# Patient Record
Sex: FEMALE | Race: OTHER | HISPANIC OR LATINO
[De-identification: names, ages, dates, MRNs, and addresses within clinical notes are randomized per-mention and may not be internally consistent; named-entity substitution may affect disease eponyms.]

---

## 2023-06-15 ENCOUNTER — APPOINTMENT (OUTPATIENT)
Dept: FAMILY MEDICINE | Facility: CLINIC | Age: 33
End: 2023-06-15
Payer: COMMERCIAL

## 2023-06-15 VITALS
SYSTOLIC BLOOD PRESSURE: 95 MMHG | TEMPERATURE: 98.4 F | BODY MASS INDEX: 25.89 KG/M2 | DIASTOLIC BLOOD PRESSURE: 63 MMHG | HEART RATE: 70 BPM | OXYGEN SATURATION: 97 % | RESPIRATION RATE: 14 BRPM | HEIGHT: 66.54 IN | WEIGHT: 163 LBS

## 2023-06-15 DIAGNOSIS — Z82.49 FAMILY HISTORY OF ISCHEMIC HEART DISEASE AND OTHER DISEASES OF THE CIRCULATORY SYSTEM: ICD-10-CM

## 2023-06-15 DIAGNOSIS — Z00.00 ENCOUNTER FOR GENERAL ADULT MEDICAL EXAMINATION W/OUT ABNORMAL FINDINGS: ICD-10-CM

## 2023-06-15 DIAGNOSIS — F12.91 CANNABIS USE, UNSPECIFIED, IN REMISSION: ICD-10-CM

## 2023-06-15 DIAGNOSIS — R09.81 NASAL CONGESTION: ICD-10-CM

## 2023-06-15 DIAGNOSIS — E04.9 NONTOXIC GOITER, UNSPECIFIED: ICD-10-CM

## 2023-06-15 DIAGNOSIS — Z80.49 FAMILY HISTORY OF MALIGNANT NEOPLASM OF OTHER GENITAL ORGANS: ICD-10-CM

## 2023-06-15 PROCEDURE — 99385 PREV VISIT NEW AGE 18-39: CPT | Mod: 25

## 2023-06-15 PROCEDURE — 36415 COLL VENOUS BLD VENIPUNCTURE: CPT

## 2023-06-15 NOTE — PAST MEDICAL HISTORY
[Menstruating] : menstruating [Definite ___ (Date)] : the last menstrual period was [unfilled] [Normal Amount/Duration] : it was of a normal amount and duration [Regular Cycle Intervals] : have been regular [Total Preg ___] : G[unfilled] [Live Births ___] : P[unfilled]  [Full Term ___] : Full Term: [unfilled] [Premature ___] : Premature: [unfilled] [Abortions ___] : Abortions:[unfilled] [Living ___] : Living: [unfilled] [AB Induced ___] : elective abortions: [unfilled]

## 2023-06-16 LAB
ALBUMIN SERPL ELPH-MCNC: 4.3 G/DL
ALP BLD-CCNC: 72 U/L
ALT SERPL-CCNC: 13 U/L
ANION GAP SERPL CALC-SCNC: 11 MMOL/L
AST SERPL-CCNC: 15 U/L
BILIRUB SERPL-MCNC: 0.2 MG/DL
BUN SERPL-MCNC: 9 MG/DL
CALCIUM SERPL-MCNC: 9.6 MG/DL
CHLORIDE SERPL-SCNC: 102 MMOL/L
CHOLEST SERPL-MCNC: 209 MG/DL
CO2 SERPL-SCNC: 24 MMOL/L
CREAT SERPL-MCNC: 0.62 MG/DL
EGFR: 119 ML/MIN/1.73M2
ESTIMATED AVERAGE GLUCOSE: 108 MG/DL
GLUCOSE SERPL-MCNC: 105 MG/DL
HBA1C MFR BLD HPLC: 5.4 %
HCV AB SER QL: NONREACTIVE
HCV S/CO RATIO: 0.2 S/CO
HDLC SERPL-MCNC: 65 MG/DL
HIV1+2 AB SPEC QL IA.RAPID: NONREACTIVE
LDLC SERPL CALC-MCNC: 124 MG/DL
NONHDLC SERPL-MCNC: 143 MG/DL
POTASSIUM SERPL-SCNC: 4.3 MMOL/L
PROT SERPL-MCNC: 6.7 G/DL
SODIUM SERPL-SCNC: 137 MMOL/L
TRIGL SERPL-MCNC: 98 MG/DL
TSH SERPL-ACNC: 1.55 UIU/ML
VIT B12 SERPL-MCNC: 1114 PG/ML

## 2023-06-30 ENCOUNTER — TRANSCRIPTION ENCOUNTER (OUTPATIENT)
Age: 33
End: 2023-06-30

## 2023-07-04 PROBLEM — E04.9 ENLARGED THYROID: Status: ACTIVE | Noted: 2023-06-15

## 2023-07-04 NOTE — PLAN
[FreeTextEntry1] : \par \par #HM\par -CMP, CBC, Lipid, A1C, TSH w. rFT4, vitamin B12 level\par -Pt verbally consented to HIV and Hep C testing\par -Flu shot annually and COVID-19 vaccination series and booster recommended\par -Patient is unvaccinated against COVID19 and does not want vaccine\par -Pt states she does not get flu shot \par -Tdap q10 years recommended- pt reportedly up to date \par -Use of sunscreen\par -Physical activity and healthy lifestyle recommended\par \par #Nasal congestion\par -ENT evaluation\par \par Labs drawn in office

## 2023-07-04 NOTE — HEALTH RISK ASSESSMENT
[Good] : ~his/her~  mood as  good [Monthly or less (1 pt)] : Monthly or less (1 point) [1 or 2 (0 pts)] : 1 or 2 (0 points) [Never (0 pts)] : Never (0 points) [Yes] : In the past 12 months have you used drugs other than those required for medical reasons? Yes [Patient reported PAP Smear was normal] : Patient reported PAP Smear was normal [None] : None [Fully functional (bathing, dressing, toileting, transferring, walking, feeding)] : Fully functional (bathing, dressing, toileting, transferring, walking, feeding) [Fully functional (using the telephone, shopping, preparing meals, housekeeping, doing laundry, using] : Fully functional and needs no help or supervision to perform IADLs (using the telephone, shopping, preparing meals, housekeeping, doing laundry, using transportation, managing medications and managing finances) [No falls in past year] : Patient reported no falls in the past year [0] : 2) Feeling down, depressed, or hopeless: Not at all (0) [PHQ-2 Negative - No further assessment needed] : PHQ-2 Negative - No further assessment needed [HIV Test offered] : HIV Test offered [Hepatitis C test offered] : Hepatitis C test offered [With Significant Other] : lives with significant other [Employed] : employed [College] : College [] :  [Sexually Active] : sexually active [Never] : Never [Audit-CScore] : 1 [de-identified] : occasionally marijuana use [de-identified] : exercises once a week  [de-identified] : eats pretty well- a lot of protein, carbs and fat  [[id number]] : 0 [Change in mental status noted] : No change in mental status noted [PapSmearDate] : 04/23 [de-identified] : lives in [place] [FreeTextEntry2] :

## 2023-07-04 NOTE — PHYSICAL EXAM
[No Acute Distress] : no acute distress [Well Nourished] : well nourished [Well Developed] : well developed [Well-Appearing] : well-appearing [Normal Sclera/Conjunctiva] : normal sclera/conjunctiva [PERRL] : pupils equal round and reactive to light [EOMI] : extraocular movements intact [Normal Outer Ear/Nose] : the outer ears and nose were normal in appearance [Normal Oropharynx] : the oropharynx was normal [Normal TMs] : both tympanic membranes were normal [No JVD] : no jugular venous distention [No Lymphadenopathy] : no lymphadenopathy [Supple] : supple [No Respiratory Distress] : no respiratory distress  [No Accessory Muscle Use] : no accessory muscle use [Clear to Auscultation] : lungs were clear to auscultation bilaterally [Normal Rate] : normal rate  [Regular Rhythm] : with a regular rhythm [Normal S1, S2] : normal S1 and S2 [No Murmur] : no murmur heard [No Carotid Bruits] : no carotid bruits [No Abdominal Bruit] : a ~M bruit was not heard ~T in the abdomen [No Varicosities] : no varicosities [Pedal Pulses Present] : the pedal pulses are present [No Edema] : there was no peripheral edema [No Palpable Aorta] : no palpable aorta [No Extremity Clubbing/Cyanosis] : no extremity clubbing/cyanosis [Normal Appearance] : normal in appearance [No Nipple Discharge] : no nipple discharge [No Axillary Lymphadenopathy] : no axillary lymphadenopathy [Soft] : abdomen soft [Non Tender] : non-tender [Non-distended] : non-distended [No Masses] : no abdominal mass palpated [No HSM] : no HSM [Normal Bowel Sounds] : normal bowel sounds [Normal Posterior Cervical Nodes] : no posterior cervical lymphadenopathy [Normal Anterior Cervical Nodes] : no anterior cervical lymphadenopathy [No CVA Tenderness] : no CVA  tenderness [No Spinal Tenderness] : no spinal tenderness [No Joint Swelling] : no joint swelling [Grossly Normal Strength/Tone] : grossly normal strength/tone [No Rash] : no rash [Coordination Grossly Intact] : coordination grossly intact [No Focal Deficits] : no focal deficits [Normal Gait] : normal gait [Normal Affect] : the affect was normal [Normal Insight/Judgement] : insight and judgment were intact [Normal Supraclavicular Nodes] : no supraclavicular lymphadenopathy [Normal Axillary Nodes] : no axillary lymphadenopathy [de-identified] : enlarged thyroid [de-identified] : \par (chaperone offered for exam today, pt refused)

## 2023-07-04 NOTE — REVIEW OF SYSTEMS
[Hair Changes] : hair changes [Negative] : Heme/Lymph [Earache] : no earache [Hearing Loss] : no hearing loss [Nosebleed] : no nosebleeds [Hoarseness] : no hoarseness [Nasal Discharge] : no nasal discharge [Sore Throat] : no sore throat [Postnasal Drip] : no postnasal drip [Itching] : no itching [Mole Changes] : no mole changes [Nail Changes] : no nail changes [Skin Rash] : no skin rash [FreeTextEntry4] : nasal congestion

## 2023-07-04 NOTE — ASSESSMENT
[FreeTextEntry1] : 34 yo female with hx of chlamydia infection and abnormal pap smear presents for comprehensive physical exam.\par

## 2023-07-04 NOTE — HISTORY OF PRESENT ILLNESS
[FreeTextEntry1] : comprehensive physical exam [de-identified] : 34 yo female with hx of chlamydia infection and abnormal pap smear presents for comprehensive physical exam.\par \par Patient reports sleeping with 2 pillows at night for 6 months due to nasal congestion. \par She also c/o hair loss.

## 2023-07-24 ENCOUNTER — NON-APPOINTMENT (OUTPATIENT)
Age: 33
End: 2023-07-24

## 2023-07-31 ENCOUNTER — NON-APPOINTMENT (OUTPATIENT)
Age: 33
End: 2023-07-31

## 2024-02-21 ENCOUNTER — APPOINTMENT (OUTPATIENT)
Dept: FAMILY MEDICINE | Facility: CLINIC | Age: 34
End: 2024-02-21
Payer: COMMERCIAL

## 2024-02-21 VITALS
OXYGEN SATURATION: 98 % | BODY MASS INDEX: 26.05 KG/M2 | HEART RATE: 89 BPM | HEIGHT: 66.54 IN | DIASTOLIC BLOOD PRESSURE: 77 MMHG | WEIGHT: 164 LBS | SYSTOLIC BLOOD PRESSURE: 117 MMHG | RESPIRATION RATE: 16 BRPM | TEMPERATURE: 97.7 F

## 2024-02-21 PROCEDURE — 99213 OFFICE O/P EST LOW 20 MIN: CPT

## 2024-02-21 NOTE — HISTORY OF PRESENT ILLNESS
[FreeTextEntry1] : thyroid nodule  [de-identified] : 33 y/o female presents today for thyroid nodule. She saw endocrinologist and was referred for another sonogram that was not completed. She has not followed up since.  Pt denies any acute complaints. No neck pain.

## 2024-02-21 NOTE — PLAN
[FreeTextEntry1] : #Thyroid nodule -US thyroid results again discussed with patient- right 3.2 x 1.9 x 3.2 cm nodule, no suspicious lymph nodes, FNA recommended. -US thyroid  -Endocrinology follow up strongly advised  Last lab results d/w patient  Pt instructed to contact office after completing sonogram for follow up of results  F/u for CPE

## 2024-02-21 NOTE — PHYSICAL EXAM
[No Acute Distress] : no acute distress [Well Developed] : well developed [Well-Appearing] : well-appearing [Normal Sclera/Conjunctiva] : normal sclera/conjunctiva [EOMI] : extraocular movements intact [No Lymphadenopathy] : no lymphadenopathy [Supple] : supple [No Respiratory Distress] : no respiratory distress  [No Accessory Muscle Use] : no accessory muscle use [Clear to Auscultation] : lungs were clear to auscultation bilaterally [Normal Rate] : normal rate  [Regular Rhythm] : with a regular rhythm [Normal S1, S2] : normal S1 and S2 [Soft] : abdomen soft [Non Tender] : non-tender [Non-distended] : non-distended [No Masses] : no abdominal mass palpated [Grossly Normal Strength/Tone] : grossly normal strength/tone [No Focal Deficits] : no focal deficits [Normal Affect] : the affect was normal [Normal Insight/Judgement] : insight and judgment were intact [de-identified] : right thyroid nodule

## 2024-03-12 ENCOUNTER — NON-APPOINTMENT (OUTPATIENT)
Age: 34
End: 2024-03-12

## 2024-03-13 ENCOUNTER — APPOINTMENT (OUTPATIENT)
Dept: FAMILY MEDICINE | Facility: CLINIC | Age: 34
End: 2024-03-13
Payer: MEDICAID

## 2024-03-13 DIAGNOSIS — E04.1 NONTOXIC SINGLE THYROID NODULE: ICD-10-CM

## 2024-03-13 PROCEDURE — ZZZZZ: CPT

## 2024-03-13 NOTE — HISTORY OF PRESENT ILLNESS
[FreeTextEntry1] : papillary thyroid cancer  [de-identified] : 35 y/o female called for follow up of FNA results- papillary thyroid cancer.   Preliminary pathology results received from pathologist today.

## 2024-03-13 NOTE — PLAN
[FreeTextEntry1] :   #Papillary thyroid cancer -Head and neck surgeon -Oncology evaluation -Endocrinology consultation   I contacted [facility] and spoke with Tiffanie today and Lucie Paiz, nurse specialist. Appointment has been scheduled for head and neck on 03/18 at 9 AM. I discussed this scheduled appointment with patient and all questions and concerns were addressed.  Will follow up final biopsy results.  F/u after head and neck surgery consultation

## 2024-03-17 ENCOUNTER — NON-APPOINTMENT (OUTPATIENT)
Age: 34
End: 2024-03-17

## 2024-03-18 ENCOUNTER — APPOINTMENT (OUTPATIENT)
Dept: OTOLARYNGOLOGY | Facility: CLINIC | Age: 34
End: 2024-03-18
Payer: MEDICAID

## 2024-03-18 VITALS — WEIGHT: 164 LBS | HEIGHT: 67 IN | HEART RATE: 75 BPM | BODY MASS INDEX: 25.74 KG/M2

## 2024-03-18 VITALS — DIASTOLIC BLOOD PRESSURE: 65 MMHG | SYSTOLIC BLOOD PRESSURE: 102 MMHG

## 2024-03-18 PROCEDURE — 99204 OFFICE O/P NEW MOD 45 MIN: CPT | Mod: 25

## 2024-03-18 PROCEDURE — 31575 DIAGNOSTIC LARYNGOSCOPY: CPT

## 2024-03-18 NOTE — ASSESSMENT
[FreeTextEntry1] : 34 year old female with no significant PMHX presents for surgical consult for papillary thyroid cancer.  -Reviewed imaging and pathology reports in depth with patient -Discussed treatment options, including surgery -- total thyroid vs lobectomy--given >4 cm and relatively rapid growth, would favor total thyroidectomy, but explained lobectomy (with completion thyroidectomy if indicated on final path) is also reasonable. The will consider their options after imaging workup completed.  -Will obtain cross sectional imaing Neck/Chest CT to better evaluate for any sloane mets -Will obtain baseline TFT's -Pt has appointment on friday with to Endocrinology, -Dr. Mclean -Follow up in 2-3 weeks to discuss imaging, labs and next steps including surgery  -She is in agreement with this plan

## 2024-03-18 NOTE — PHYSICAL EXAM
[Midline] : trachea located in midline position [Normal] : no rashes [de-identified] : Palpable thyroid nodule

## 2024-03-18 NOTE — HISTORY OF PRESENT ILLNESS
[de-identified] : 34 year old female with no significant PMHX presents for surgical consult for papillary thyroid cancer. FNA of 4 cm right thyroid nodule was performed on 3/12/24 demonstrating papillary thyroid cancer.  States she has to frequently swallow when she lays down  Denies dysphagia, odynophagia, aspirations, dyspnea, dysphonia Pending ESS in May - she is unsure with which doctor

## 2024-03-19 ENCOUNTER — NON-APPOINTMENT (OUTPATIENT)
Age: 34
End: 2024-03-19

## 2024-03-21 ENCOUNTER — APPOINTMENT (OUTPATIENT)
Dept: CT IMAGING | Facility: CLINIC | Age: 34
End: 2024-03-21
Payer: MEDICAID

## 2024-03-21 PROCEDURE — 70491 CT SOFT TISSUE NECK W/DYE: CPT

## 2024-03-21 PROCEDURE — 71260 CT THORAX DX C+: CPT

## 2024-03-22 ENCOUNTER — APPOINTMENT (OUTPATIENT)
Dept: ENDOCRINOLOGY | Facility: CLINIC | Age: 34
End: 2024-03-22
Payer: MEDICAID

## 2024-03-22 VITALS
DIASTOLIC BLOOD PRESSURE: 85 MMHG | HEART RATE: 65 BPM | SYSTOLIC BLOOD PRESSURE: 111 MMHG | BODY MASS INDEX: 25.37 KG/M2 | WEIGHT: 162 LBS

## 2024-03-22 DIAGNOSIS — E03.9 HYPOTHYROIDISM, UNSPECIFIED: ICD-10-CM

## 2024-03-22 PROCEDURE — 99205 OFFICE O/P NEW HI 60 MIN: CPT | Mod: 25

## 2024-03-22 NOTE — REVIEW OF SYSTEMS
[Fatigue] : no fatigue [Decreased Appetite] : appetite not decreased [Recent Weight Gain (___ Lbs)] : no recent weight gain [Recent Weight Loss (___ Lbs)] : no recent weight loss [Visual Field Defect] : no visual field defect [Dry Eyes] : no dryness [Dysphagia] : no dysphagia [Neck Pain] : no neck pain [Dysphonia] : no dysphonia [Nasal Congestion] : no nasal congestion [Chest Pain] : no chest pain [Slow Heart Rate] : heart rate is not slow [Palpitations] : no palpitations [Fast Heart Rate] : heart rate is not fast [Shortness Of Breath] : no shortness of breath [Cough] : no cough [Nausea] : no nausea [Constipation] : no constipation [Vomiting] : no vomiting [Polyuria] : no polyuria [Irregular Menses] : regular menses [Joint Pain] : no joint pain [Muscle Weakness] : no muscle weakness [Acanthosis] : no acanthosis  [Acne] : no acne [Headaches] : no headaches [Dizziness] : no dizziness [Tremors] : no tremors [Depression] : no depression [Polydipsia] : no polydipsia [Cold Intolerance] : no cold intolerance [Easy Bleeding] : no ~M tendency for easy bleeding [Easy Bruising] : no tendency for easy bruising

## 2024-03-22 NOTE — HISTORY OF PRESENT ILLNESS
[FreeTextEntry1] : 34-year-old female here for evaluation of thyroid cancer ( Recently diagnosed 03/2024) pending thyroidectomy. ENT: Dr. Denny   7/2024: Saw PCP for increased hair loss, difficulty losing weight, increased fatigue.  PCP palpated felt her thyroid- subsequently US was ordered  07/2024: Noted to have a 3.2 cm nodule on the right  02/2024: Increased size and subsequent FNA showing [place] VI ( Papillary thyroid cancer)   Subsequently saw Dr. Denny and a CT scan of the neck was ordered.  Reported mild compressive symptoms No family history of thyroid cancer

## 2024-03-22 NOTE — ASSESSMENT
[FreeTextEntry1] : 34-year-old female with recent diagnosis of PTC  [place] VI (4 cm nodule) pending thyroidectomy.  Long term management of thyroid cancer was discussed extensively.  Will discuss surgical pathology and risk stratification after the results are available.   -Follow up 4 weeks post surgically  -RANDOLPH and further long-term management will be discussed post op -Discussed that she may possibly require Synthroid   -Follow up in 2 months

## 2024-03-25 ENCOUNTER — TRANSCRIPTION ENCOUNTER (OUTPATIENT)
Age: 34
End: 2024-03-25

## 2024-03-25 LAB
T3 SERPL-MCNC: 95 NG/DL
T4 FREE SERPL-MCNC: 1.2 NG/DL
THYROGLOB AB SERPL-ACNC: <20 IU/ML
THYROGLOB AB SERPL-ACNC: <20 IU/ML
THYROGLOB SERPL-MCNC: 192 NG/ML
THYROPEROXIDASE AB SERPL IA-ACNC: <10 IU/ML
TSH SERPL-ACNC: 1.31 UIU/ML

## 2024-04-01 ENCOUNTER — APPOINTMENT (OUTPATIENT)
Dept: OTOLARYNGOLOGY | Facility: CLINIC | Age: 34
End: 2024-04-01
Payer: MEDICAID

## 2024-04-01 VITALS
WEIGHT: 162 LBS | OXYGEN SATURATION: 95 % | SYSTOLIC BLOOD PRESSURE: 102 MMHG | HEIGHT: 67 IN | DIASTOLIC BLOOD PRESSURE: 69 MMHG | HEART RATE: 86 BPM | BODY MASS INDEX: 25.43 KG/M2

## 2024-04-01 PROCEDURE — 99214 OFFICE O/P EST MOD 30 MIN: CPT

## 2024-04-01 RX ORDER — BUDESONIDE 32 UG/1
32 SPRAY, METERED NASAL
Qty: 8 | Refills: 0 | Status: COMPLETED | COMMUNITY
Start: 2023-12-21

## 2024-04-01 NOTE — HISTORY OF PRESENT ILLNESS
[de-identified] : 34 year old female with no significant PMHX presents for surgical consult for papillary thyroid cancer. FNA of 4 cm right thyroid nodule was performed on 3/12/24 demonstrating papillary thyroid cancer. Patient denies throat pain, globus sensation, dysphagia, odynophagia, dyspnea, dysphonia, hemoptysis or otalgia.  Denies recent fevers/infections, chills, night sweats, weight loss.   Recent Labs: 3/25/24 Thyroglobulin- 192 ng/ml  TSH: 1.31 uIU/mL Free T4  1.2 ng/dL	 T3	95 ng/dL Antithyroid Ab  were all in normal ranges  States she has to frequently swallow when she lays down  Denies dysphagia, odynophagia, aspirations, dyspnea, dysphonia Pending ESS in May - she is unsure with which doctor  CT NECK IV_3/21/24  FINDINGS: BRAIN: No focal enhancing mass lesions in the partially imaged brain parenchyma SKULL BASE: Unremarkable AERODIGESTIVE TRACT: No masslike lesions or abnormal enhancement the base of the tongue, oropharynx and nasopharynx. Lymphoid hyperplasia of the lingual tonsils. Left-sided tonsilloliths. No lesions or abnormal collection in retropharyngeal spaces. Paraglottic fat is intact. Vocal cords are symmetric. Supraglottic, glottic and infraglottic airways are patent. LYMPH NODES: No cervical adenopathy by CT size criteria. Subcentimeter prominent left station 2A lymph node, nonspecific. (Series 5, image 38 SALIVARY GLANDS: Salivary glands are normal in size and symmetric. No abnormal enhancement or calcifications are identified. THYROID: Asymmetrically enlargement of the right thyroid gland with large about 2.6 cm x 2.0 cm x 4.3 cm (AP X TR X CC) heterogeneous nodule. No exophytic component or extension beyond the thyroid gland. PARANASAL SINUSES: Scattered opacification of ethmoid air cell complex. Polypoid mucosal thickening versus retention cyst and aerosolized secretion in the left sphenoid chamber. Mild mucosal thickening in the maxillary sinus and opacification of maxillary infundibula. ORBITS: Intraorbital content is unremarkable. VESSELS: Patent extracranial carotid and vertebral arteries accounting for limits of given exam which is not tailored for angiographic assessment. LUNG APICES: No acute findings. No mass lesions. Please refer to concurrently obtained CT chest report. BONES: No bony destructive lesions.  IMPRESSION: No masslike lesions or abnormal enhancement in aerodigestive mucosa. No cervical adenopathy. Nonspecific prominent left station 2A lymph node. Right thyroid asymmetrical enlargement containing large heterogeneous nodule, biopsy-proven neoplastic lesion.   - CT CHEST IC  DATE:  03/21/2024. CT scan of the chest was obtained following administration of intravenous contrast. Approximately 90 cc of Omnipaque was administered and 10 cc was discarded. Right lobe of the thyroid gland is enlarged in size and heterogeneous in attenuation. No hilar or mediastinal adenopathy is noted. Heart is normal in size. No pericardial effusion is noted. No endobronchial lesions are noted. Lungs are clear. No pleural effusions are noted. Below the diaphragm, visualized portions of the abdomen are unremarkable. Visualized osseous structures are within normal limits. IMPRESSION: Clear lungs.

## 2024-04-01 NOTE — ASSESSMENT
[FreeTextEntry1] : 34 year old female with no significant PMHX presents for surgical consult for papillary thyroid cancer.  -Reviewed imaging reports in depth with patient -Discussed treatment options, including surgery -- total thyroid vs lobectomy--given >4 cm and relatively rapid growth, would favor total thyroidectomy, but explained lobectomy (with completion thyroidectomy if indicated on final path) is also reasonable. -Pt has expressed her wished to proceed with surgery---she strongly prefers to begin with an initial lobectomy approach with the understanding she may need completion thyroidectomy pending final pathology results.  -Continue following with Endocrinology, (Dr. Mclean) -Will begin to look for surgery dates  -She is in agreement with this plan

## 2024-04-16 ENCOUNTER — OUTPATIENT (OUTPATIENT)
Dept: OUTPATIENT SERVICES | Facility: HOSPITAL | Age: 34
LOS: 1 days | End: 2024-04-16

## 2024-04-16 VITALS
TEMPERATURE: 97 F | OXYGEN SATURATION: 97 % | HEART RATE: 68 BPM | RESPIRATION RATE: 16 BRPM | HEIGHT: 66 IN | SYSTOLIC BLOOD PRESSURE: 102 MMHG | DIASTOLIC BLOOD PRESSURE: 62 MMHG | WEIGHT: 160.06 LBS

## 2024-04-16 DIAGNOSIS — C73 MALIGNANT NEOPLASM OF THYROID GLAND: ICD-10-CM

## 2024-04-16 DIAGNOSIS — Z98.890 OTHER SPECIFIED POSTPROCEDURAL STATES: Chronic | ICD-10-CM

## 2024-04-16 LAB
HCG SERPL-ACNC: <1 MIU/ML — SIGNIFICANT CHANGE UP
HCT VFR BLD CALC: 43.8 % — SIGNIFICANT CHANGE UP (ref 34.5–45)
HGB BLD-MCNC: 14.6 G/DL — SIGNIFICANT CHANGE UP (ref 11.5–15.5)
MCHC RBC-ENTMCNC: 29 PG — SIGNIFICANT CHANGE UP (ref 27–34)
MCHC RBC-ENTMCNC: 33.3 GM/DL — SIGNIFICANT CHANGE UP (ref 32–36)
MCV RBC AUTO: 86.9 FL — SIGNIFICANT CHANGE UP (ref 80–100)
NRBC # BLD: 0 /100 WBCS — SIGNIFICANT CHANGE UP (ref 0–0)
NRBC # FLD: 0 K/UL — SIGNIFICANT CHANGE UP (ref 0–0)
PLATELET # BLD AUTO: 270 K/UL — SIGNIFICANT CHANGE UP (ref 150–400)
RBC # BLD: 5.04 M/UL — SIGNIFICANT CHANGE UP (ref 3.8–5.2)
RBC # FLD: 13.3 % — SIGNIFICANT CHANGE UP (ref 10.3–14.5)
WBC # BLD: 5.96 K/UL — SIGNIFICANT CHANGE UP (ref 3.8–10.5)
WBC # FLD AUTO: 5.96 K/UL — SIGNIFICANT CHANGE UP (ref 3.8–10.5)

## 2024-04-16 RX ORDER — SODIUM CHLORIDE 9 MG/ML
1000 INJECTION, SOLUTION INTRAVENOUS
Refills: 0 | Status: DISCONTINUED | OUTPATIENT
Start: 2024-04-23 | End: 2024-05-07

## 2024-04-16 NOTE — H&P PST ADULT - NSICDXPASTMEDICALHX_GEN_ALL_CORE_FT
PAST MEDICAL HISTORY:  Cervical dysplasia     H/O cosmetic surgery     Malignant neoplasm of thyroid gland

## 2024-04-16 NOTE — H&P PST ADULT - HISTORY OF PRESENT ILLNESS
33 y/o female with no significant PMH presents to presurgical testing with diagnosis of malignant neoplasm of thyroid. Pt with an enlarging right thyroid nodule. FNA of 4 cm right thyroid nodule was performed on 3/12/24 demonstrating papillary thyroid cancer. Pt is scheduled for a right thyroid lobectomy possible total thyroidectomy.

## 2024-04-16 NOTE — H&P PST ADULT - FUNCTIONAL STATUS
Plan to collect labs, imaging. Follow-up in 3 months with Endocrinology clinic and RD. Please alert Endocrinology if further breast budding, onset of vaginal bleed, increasing pubic hair, rapid growth is noticed before follow-up and sooner evaluation will be considered. DASI 9.89

## 2024-04-16 NOTE — H&P PST ADULT - PROBLEM SELECTOR PLAN 1
Patient tentatively scheduled for right thyroid lobectomy possible total thyroidectomy for 4/23/24. Pre-op instructions provided. Pt given verbal and written instructions with teach back on chlorhexidine shampoo and pepcid. Pt verbalized understanding with return demonstration.     CBC HCG done.

## 2024-04-16 NOTE — H&P PST ADULT - TOBACCO USE
Never smoker Hydroxyzine Counseling: Patient advised that the medication is sedating and not to drive a car after taking this medication.  Patient informed of potential adverse effects including but not limited to dry mouth, urinary retention, and blurry vision.  The patient verbalized understanding of the proper use and possible adverse effects of hydroxyzine.  All of the patient's questions and concerns were addressed.

## 2024-04-22 ENCOUNTER — TRANSCRIPTION ENCOUNTER (OUTPATIENT)
Age: 34
End: 2024-04-22

## 2024-04-22 NOTE — ASU PATIENT PROFILE, ADULT - FALL HARM RISK - UNIVERSAL INTERVENTIONS
Bed in lowest position, wheels locked, appropriate side rails in place/Call bell, personal items and telephone in reach/Instruct patient to call for assistance before getting out of bed or chair/Non-slip footwear when patient is out of bed/[place] to call system/Physically safe environment - no spills, clutter or unnecessary equipment/Purposeful Proactive Rounding/Room/bathroom lighting operational, light cord in reach

## 2024-04-23 ENCOUNTER — OUTPATIENT (OUTPATIENT)
Dept: OUTPATIENT SERVICES | Facility: HOSPITAL | Age: 34
LOS: 1 days | Discharge: HOME | End: 2024-04-23
Payer: MEDICAID

## 2024-04-23 ENCOUNTER — TRANSCRIPTION ENCOUNTER (OUTPATIENT)
Age: 34
End: 2024-04-23

## 2024-04-23 ENCOUNTER — APPOINTMENT (OUTPATIENT)
Dept: OTOLARYNGOLOGY | Facility: HOSPITAL | Age: 34
End: 2024-04-23
Payer: MEDICAID

## 2024-04-23 ENCOUNTER — RESULT REVIEW (OUTPATIENT)
Age: 34
End: 2024-04-23

## 2024-04-23 VITALS
WEIGHT: 160.06 LBS | HEART RATE: 67 BPM | TEMPERATURE: 98 F | DIASTOLIC BLOOD PRESSURE: 71 MMHG | OXYGEN SATURATION: 98 % | RESPIRATION RATE: 14 BRPM | HEIGHT: 66 IN | SYSTOLIC BLOOD PRESSURE: 100 MMHG

## 2024-04-23 VITALS
HEART RATE: 81 BPM | OXYGEN SATURATION: 97 % | DIASTOLIC BLOOD PRESSURE: 61 MMHG | RESPIRATION RATE: 15 BRPM | SYSTOLIC BLOOD PRESSURE: 102 MMHG

## 2024-04-23 DIAGNOSIS — Z98.890 OTHER SPECIFIED POSTPROCEDURAL STATES: Chronic | ICD-10-CM

## 2024-04-23 DIAGNOSIS — C73 MALIGNANT NEOPLASM OF THYROID GLAND: ICD-10-CM

## 2024-04-23 LAB — HCG UR QL: NEGATIVE — SIGNIFICANT CHANGE UP

## 2024-04-23 PROCEDURE — 60220 PARTIAL REMOVAL OF THYROID: CPT | Mod: GC

## 2024-04-23 PROCEDURE — ZZZZZ: CPT

## 2024-04-23 PROCEDURE — 88307 TISSUE EXAM BY PATHOLOGIST: CPT | Mod: 26

## 2024-04-23 DEVICE — CLIP APPLIER COVIDIEN SURGICLIP 11.5" MEDIUM: Type: IMPLANTABLE DEVICE | Status: FUNCTIONAL

## 2024-04-23 DEVICE — SURGICEL 2 X 14": Type: IMPLANTABLE DEVICE | Status: FUNCTIONAL

## 2024-04-23 DEVICE — CLIP APPLIER COVIDIEN SURGICLIP III 9" SM: Type: IMPLANTABLE DEVICE | Status: FUNCTIONAL

## 2024-04-23 RX ORDER — ONDANSETRON 8 MG/1
4 TABLET, FILM COATED ORAL ONCE
Refills: 0 | Status: DISCONTINUED | OUTPATIENT
Start: 2024-04-23 | End: 2024-05-07

## 2024-04-23 RX ORDER — HYDROMORPHONE HYDROCHLORIDE 2 MG/ML
0.5 INJECTION INTRAMUSCULAR; INTRAVENOUS; SUBCUTANEOUS
Refills: 0 | Status: DISCONTINUED | OUTPATIENT
Start: 2024-04-23 | End: 2024-04-23

## 2024-04-23 RX ORDER — OXYCODONE HYDROCHLORIDE 5 MG/1
5 TABLET ORAL ONCE
Refills: 0 | Status: DISCONTINUED | OUTPATIENT
Start: 2024-04-23 | End: 2024-04-23

## 2024-04-23 RX ORDER — OXYCODONE HYDROCHLORIDE 5 MG/1
1 TABLET ORAL
Qty: 6 | Refills: 0
Start: 2024-04-23

## 2024-04-23 NOTE — ASU DISCHARGE PLAN (ADULT/PEDIATRIC) - CARE PROVIDER_API CALL
Ishan Denny  Otolaryngology  [address] [place] [id number]  Phone: [phone]  Fax: [phone]  Follow Up Time:

## 2024-04-23 NOTE — ASU DISCHARGE PLAN (ADULT/PEDIATRIC) - MEDICATION INSTRUCTIONS
For mild pain you may take 650mg of tylenol every 6 hours or 600mg of ibuprofen every 6 hours. For severe pain take 5mg oxycodone every 6 hours as needed.

## 2024-04-30 LAB — SURGICAL PATHOLOGY STUDY: SIGNIFICANT CHANGE UP

## 2024-05-01 PROBLEM — Z85.850 HISTORY OF PAPILLARY THYROID CARCINOMA: Status: RESOLVED | Noted: 2024-05-01 | Resolved: 2024-05-01

## 2024-05-06 ENCOUNTER — APPOINTMENT (OUTPATIENT)
Dept: OTOLARYNGOLOGY | Facility: CLINIC | Age: 34
End: 2024-05-06
Payer: MEDICAID

## 2024-05-06 VITALS — HEART RATE: 87 BPM | OXYGEN SATURATION: 96 % | DIASTOLIC BLOOD PRESSURE: 69 MMHG | SYSTOLIC BLOOD PRESSURE: 101 MMHG

## 2024-05-06 DIAGNOSIS — Z85.850 PERSONAL HISTORY OF MALIGNANT NEOPLASM OF THYROID: ICD-10-CM

## 2024-05-06 PROBLEM — Z98.890 OTHER SPECIFIED POSTPROCEDURAL STATES: Chronic | Status: ACTIVE | Noted: 2024-04-16

## 2024-05-06 PROBLEM — C73 MALIGNANT NEOPLASM OF THYROID GLAND: Chronic | Status: ACTIVE | Noted: 2024-04-16

## 2024-05-06 PROCEDURE — 99024 POSTOP FOLLOW-UP VISIT: CPT

## 2024-05-06 NOTE — HISTORY OF PRESENT ILLNESS
[de-identified] : 34 year old female with no significant PMHX presents for follow up of papillary thyroid cancer. She is s/p  Right thyroid lobectomy and isthmusectomy from 4/23/24: Pathology from the OR is consistent with papillary thyroid cancer. She is doing well since surgery  Pt denies dysphonia, dysphagia, dyspnea, pain, recent fevers or unintentional weight loss. FNA of 4 cm right thyroid nodule was performed on 3/12/24 demonstrating papillary thyroid cancer.   Recent Labs: 3/25/24 Thyroglobulin- 192 ng/ml  TSH: 1.31 uIU/mL Free T4  1.2 ng/dL	 T3	95 ng/dL Antithyroid Ab  were all in normal ranges  States she has to frequently swallow when she lays down  Denies dysphagia, odynophagia, aspirations, dyspnea, dysphonia Pending ESS in May - she is unsure with which doctor  CT NECK IV_3/21/24  FINDINGS: BRAIN: No focal enhancing mass lesions in the partially imaged brain parenchyma SKULL BASE: Unremarkable AERODIGESTIVE TRACT: No masslike lesions or abnormal enhancement the base of the tongue, oropharynx and nasopharynx. Lymphoid hyperplasia of the lingual tonsils. Left-sided tonsilloliths. No lesions or abnormal collection in retropharyngeal spaces. Paraglottic fat is intact. Vocal cords are symmetric. Supraglottic, glottic and infraglottic airways are patent. LYMPH NODES: No cervical adenopathy by CT size criteria. Subcentimeter prominent left station 2A lymph node, nonspecific. (Series 5, image 38 SALIVARY GLANDS: Salivary glands are normal in size and symmetric. No abnormal enhancement or calcifications are identified. THYROID: Asymmetrically enlargement of the right thyroid gland with large about 2.6 cm x 2.0 cm x 4.3 cm (AP X TR X CC) heterogeneous nodule. No exophytic component or extension beyond the thyroid gland. PARANASAL SINUSES: Scattered opacification of ethmoid air cell complex. Polypoid mucosal thickening versus retention cyst and aerosolized secretion in the left sphenoid chamber. Mild mucosal thickening in the maxillary sinus and opacification of maxillary infundibula. ORBITS: Intraorbital content is unremarkable. VESSELS: Patent extracranial carotid and vertebral arteries accounting for limits of given exam which is not tailored for angiographic assessment. LUNG APICES: No acute findings. No mass lesions. Please refer to concurrently obtained CT chest report. BONES: No bony destructive lesions.  IMPRESSION: No masslike lesions or abnormal enhancement in aerodigestive mucosa. No cervical adenopathy. Nonspecific prominent left station 2A lymph node. Right thyroid asymmetrical enlargement containing large heterogeneous nodule, biopsy-proven neoplastic lesion.   - CT CHEST IC  DATE:  03/21/2024. CT scan of the chest was obtained following administration of intravenous contrast. Approximately 90 cc of Omnipaque was administered and 10 cc was discarded. Right lobe of the thyroid gland is enlarged in size and heterogeneous in attenuation. No hilar or mediastinal adenopathy is noted. Heart is normal in size. No pericardial effusion is noted. No endobronchial lesions are noted. Lungs are clear. No pleural effusions are noted. Below the diaphragm, visualized portions of the abdomen are unremarkable. Visualized osseous structures are within normal limits. IMPRESSION: Clear lungs.

## 2024-05-06 NOTE — ASSESSMENT
[FreeTextEntry1] : 34 year old female with no significant PMHX presents for surgical consult for papillary thyroid cancer.  -She is doing well since surgery, incision healing well -Reviewed pathology in depth with patient -No need for completion thyroidectomy at this time--- no aggressive features on pathology, no LVI, no PNI, no ETE, classical variant 3cm, clear margins, will continue with close observation at this point of contralateral lobe -Follow up in 3 months, for follow up and surveillance  -Continue following with Endocrinology, (Dr. Mclean) -She is in agreement with this plan

## 2024-05-06 NOTE — REASON FOR VISIT
[Post-Operative Visit] : a post-operative visit [FreeTextEntry1] : s/p  Right thyroid lobectomy and isthmusectomy

## 2024-05-06 NOTE — PHYSICAL EXAM
[Midline] : trachea located in midline position [Normal] : no rashes [de-identified] : Incision healing well with no s/s infection noted

## 2024-05-23 ENCOUNTER — APPOINTMENT (OUTPATIENT)
Dept: ENDOCRINOLOGY | Facility: CLINIC | Age: 34
End: 2024-05-23
Payer: MEDICAID

## 2024-05-23 ENCOUNTER — LABORATORY RESULT (OUTPATIENT)
Age: 34
End: 2024-05-23

## 2024-05-23 VITALS
WEIGHT: 156 LBS | HEART RATE: 72 BPM | SYSTOLIC BLOOD PRESSURE: 114 MMHG | DIASTOLIC BLOOD PRESSURE: 70 MMHG | BODY MASS INDEX: 24.43 KG/M2

## 2024-05-23 DIAGNOSIS — C73 MALIGNANT NEOPLASM OF THYROID GLAND: ICD-10-CM

## 2024-05-23 PROCEDURE — 99215 OFFICE O/P EST HI 40 MIN: CPT | Mod: 25

## 2024-05-23 NOTE — ASSESSMENT
[FreeTextEntry1] : 34-year-old female with recent diagnosis of PTC  [place] VI (3 cm nodule) now s/p right hemithyroidectomy and isthmustectomy. No angioinvasion, lymphatic invasion or ETE. [id number]  Long term management of thyroid cancer was discussed extensively.  Will discuss surgical pathology and risk stratification after the results are available.   -Low risk of recurrence -TSH goal of <2.0  -Discussed that she may possibly require Synthroid  -Check TFTs, Tg -US will be performed in 6 months   -Follow up in 6 months

## 2024-05-23 NOTE — PHYSICAL EXAM
[Alert] : alert [Well Nourished] : well nourished [No Acute Distress] : no acute distress [Normal Sclera/Conjunctiva] : normal sclera/conjunctiva [PERRL] : pupils equal, round and reactive to light [Normal Outer Ear/Nose] : the ears and nose were normal in appearance [Supple] : the neck was supple [Well Healed Scar] : well healed scar [No Respiratory Distress] : no respiratory distress [Clear to Auscultation] : lungs were clear to auscultation bilaterally [Normal S1, S2] : normal S1 and S2 [Normal Rate] : heart rate was normal [Regular Rhythm] : with a regular rhythm [Normal Bowel Sounds] : normal bowel sounds [Soft] : abdomen soft [Normal Gait] : normal gait [No Joint Swelling] : no joint swelling seen [No Rash] : no rash [Oriented x3] : oriented to person, place, and time [Normal Insight/Judgement] : insight and judgment were intact

## 2024-05-23 NOTE — REVIEW OF SYSTEMS
[Fatigue] : no fatigue [Decreased Appetite] : appetite not decreased [Recent Weight Gain (___ Lbs)] : no recent weight gain [Recent Weight Loss (___ Lbs)] : no recent weight loss [Visual Field Defect] : no visual field defect [Dry Eyes] : no dryness [Dysphagia] : no dysphagia [Neck Pain] : no neck pain [Dysphonia] : no dysphonia [Nasal Congestion] : no nasal congestion [Chest Pain] : no chest pain [Palpitations] : no palpitations [Shortness Of Breath] : no shortness of breath [Cough] : no cough [Nausea] : no nausea [Constipation] : no constipation [Vomiting] : no vomiting [Diarrhea] : no diarrhea [Irregular Menses] : regular menses [Joint Pain] : no joint pain [Acanthosis] : no acanthosis  [Headaches] : no headaches [Tremors] : no tremors [Depression] : no depression [Polydipsia] : no polydipsia [Easy Bleeding] : no ~M tendency for easy bleeding

## 2024-05-23 NOTE — HISTORY OF PRESENT ILLNESS
[FreeTextEntry1] : 34-year-old female here for evaluation of thyroid cancer ( Recently diagnosed 03/2024) pending thyroidectomy. ENT: Dr. Denny   7/2024: Saw PCP for increased hair loss, difficulty losing weight, increased fatigue.  PCP palpated felt her thyroid- subsequently US was ordered  07/2024: Noted to have a 3.2 cm nodule on the right  02/2024: Increased size and subsequent FNA showing [place] VI ( Papillary thyroid cancer)   Now s/p right hemithyroidectomy with isthmustectomy   Symptoms: Some hair loss  No constipation  Lost weight 6 lbs from dieting  No Hp or tremors  Normal energy  Periods are regular    [facility] Accession Number : [id number] Patient:     BEATRICE JOHNSONCAROL DEGROOT   Accession:                             [id number]  Collected Date/Time:                   4/23/2024 12:25 EDT Received Date/Time:                    4/24/2024 15:45 EDT  Surgical Pathology Report - Auth (Verified)  Specimen(s) Submitted 1- Right thyroid lobectomy and isthmus  Final Diagnosis  1.  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 [address] Phone: [phone]   Fax: [phone] _________________________________________________________________   Synoptic Summary 1: Thyroid Gland - Resection Specimen Procedure:  Right lobectomy with isthmusectomy (hemithyroidectomy) Tumor Tumor Focality:   Unifocal Tumor Characteristics Tumor Site:  Right lobe Tumor Size:  3.0 Centimeters (cm) Histologic Tumor Types and Subtypes:    Papillary carcinoma, classic subtype Tumor Proliferative Activity Mitotic Rate:  Less than 3 mitoses per 2mm2 Tumor Necrosis:   Not identified Angioinvasion (vascular invasion):    Not identified Lymphatic Invasion:   Not identified Extrathyroidal Extension:   Not identified Margin Status:   All margins negative for carcinoma Distance from Invasive Carcinoma to Closest Margin:    0.1 mm from circumferential margins Regional Lymph Nodes Regional Lymph Node Status:   All regional lymph nodes negative for tumor Number of Lymph Nodes Examined:   1 Sanjay Level(s) Examined:   Level VI pTNM Classification (AJCC 8th Edition) pT Category:   pT2 pN Category:   pN0a    Supplements: Vitamin D and Vitamin C, probiotics

## 2024-05-24 DIAGNOSIS — C73 MALIGNANT NEOPLASM OF THYROID GLAND: ICD-10-CM

## 2024-05-24 LAB
T4 FREE SERPL-MCNC: 0.9 NG/DL
TSH SERPL-ACNC: 13 UIU/ML

## 2024-05-24 RX ORDER — LEVOTHYROXINE SODIUM 0.07 MG/1
75 TABLET ORAL DAILY
Qty: 1 | Refills: 1 | Status: ACTIVE | COMMUNITY
Start: 2024-05-24 | End: 1900-01-01

## 2024-05-30 LAB
THYROGLOB AB SERPL-ACNC: <20 IU/ML
THYROGLOB SERPL-MCNC: 24.5 NG/ML

## 2024-07-29 ENCOUNTER — APPOINTMENT (OUTPATIENT)
Dept: OTOLARYNGOLOGY | Facility: CLINIC | Age: 34
End: 2024-07-29
Payer: MEDICAID

## 2024-07-29 VITALS
SYSTOLIC BLOOD PRESSURE: 90 MMHG | WEIGHT: 156 LBS | DIASTOLIC BLOOD PRESSURE: 52 MMHG | BODY MASS INDEX: 24.48 KG/M2 | HEIGHT: 67 IN

## 2024-07-29 DIAGNOSIS — J31.0 CHRONIC RHINITIS: ICD-10-CM

## 2024-07-29 PROCEDURE — 99214 OFFICE O/P EST MOD 30 MIN: CPT | Mod: 25

## 2024-07-29 PROCEDURE — 31575 DIAGNOSTIC LARYNGOSCOPY: CPT

## 2024-07-29 RX ORDER — AZELASTINE HYDROCHLORIDE 137 UG/1
0.1 SPRAY, METERED NASAL TWICE DAILY
Qty: 1 | Refills: 3 | Status: ACTIVE | COMMUNITY
Start: 2024-07-29 | End: 1900-01-01

## 2024-07-29 NOTE — ASSESSMENT
[FreeTextEntry1] : 34 year old female with no significant PMHX presents for surgical consult for papillary thyroid cancer.  -She continues well since surgery, incision healed well -No need for completion thyroidectomy at this time--- no aggressive features on pathology, no LVI, no PNI, no ETE, classical variant 3cm, clear margins, will continue with close observation at this point of contralateral lobe -Reviewed TFT's in depth, TSH elevated to 13, discussed rational for Synthroid and maintaining supressed TSH -Will obtain TFTs and pt endorses she will be following up with  before scheduled apt in November  -will obtain repeat thyroid US in 2 months  -Follow up in 3 months, for follow up, to discuss TFT's and US  -Continue following with Endocrinology, (Dr. Mclean) -She is in agreement with this plan

## 2024-07-29 NOTE — REASON FOR VISIT
[Subsequent Evaluation] : a subsequent evaluation for [Spouse] : spouse [FreeTextEntry1] : PTC-She is s/p Right thyroid lobectomy and isthmusectomy from 4/23/24

## 2024-07-29 NOTE — HISTORY OF PRESENT ILLNESS
[de-identified] : 34 year old female with no significant PMHX presents for follow up of papillary thyroid cancer. She is s/p  Right thyroid lobectomy and isthmusectomy from 4/23/24: Pathology from the OR is consistent with papillary thyroid cancer. She is doing well since surgery  Pt denies dysphonia, dysphagia, dyspnea, pain, recent fevers or unintentional weight loss. FNA of 4 cm right thyroid nodule was performed on 3/12/24 demonstrating papillary thyroid cancer.   Recent Labs: 5/25/24 Thyroglobulin- 24.5 ng/ml    previously 192 ng/ml  TSH: 13  uIU/mL     previously 1.31 uIU/mL Free T4  0.9 ng/dL  previously  1.2 ng/dL	 Antithyroid Ab  were all in normal ranges   Denies dysphagia, odynophagia, aspirations, dyspnea, dysphonia  CT NECK IV_3/21/24 FINDINGS: BRAIN: No focal enhancing mass lesions in the partially imaged brain parenchyma SKULL BASE: Unremarkable AERODIGESTIVE TRACT: No masslike lesions or abnormal enhancement the base of the tongue, oropharynx and nasopharynx. Lymphoid hyperplasia of the lingual tonsils. Left-sided tonsilloliths. No lesions or abnormal collection in retropharyngeal spaces. Paraglottic fat is intact. Vocal cords are symmetric. Supraglottic, glottic and infraglottic airways are patent. LYMPH NODES: No cervical adenopathy by CT size criteria. Subcentimeter prominent left station 2A lymph node, nonspecific. (Series 5, image 38 SALIVARY GLANDS: Salivary glands are normal in size and symmetric. No abnormal enhancement or calcifications are identified. THYROID: Asymmetrically enlargement of the right thyroid gland with large about 2.6 cm x 2.0 cm x 4.3 cm (AP X TR X CC) heterogeneous nodule. No exophytic component or extension beyond the thyroid gland. PARANASAL SINUSES: Scattered opacification of ethmoid air cell complex. Polypoid mucosal thickening versus retention cyst and aerosolized secretion in the left sphenoid chamber. Mild mucosal thickening in the maxillary sinus and opacification of maxillary infundibula. ORBITS: Intraorbital content is unremarkable. VESSELS: Patent extracranial carotid and vertebral arteries accounting for limits of given exam which is not tailored for angiographic assessment. LUNG APICES: No acute findings. No mass lesions. Please refer to concurrently obtained CT chest report. BONES: No bony destructive lesions.  IMPRESSION: No masslike lesions or abnormal enhancement in aerodigestive mucosa. No cervical adenopathy. Nonspecific prominent left station 2A lymph node. Right thyroid asymmetrical enlargement containing large heterogeneous nodule, biopsy-proven neoplastic lesion.   - CT CHEST IC  DATE:  03/21/2024. CT scan of the chest was obtained following administration of intravenous contrast. Approximately 90 cc of Omnipaque was administered and 10 cc was discarded. Right lobe of the thyroid gland is enlarged in size and heterogeneous in attenuation. No hilar or mediastinal adenopathy is noted. Heart is normal in size. No pericardial effusion is noted. No endobronchial lesions are noted. Lungs are clear. No pleural effusions are noted. Below the diaphragm, visualized portions of the abdomen are unremarkable. Visualized osseous structures are within normal limits. IMPRESSION: Clear lungs.

## 2024-07-29 NOTE — HISTORY OF PRESENT ILLNESS
[de-identified] : 34 year old female with no significant PMHX presents for follow up of papillary thyroid cancer. She is s/p  Right thyroid lobectomy and isthmusectomy from 4/23/24: Pathology from the OR is consistent with papillary thyroid cancer. She is doing well since surgery  Pt denies dysphonia, dysphagia, dyspnea, pain, recent fevers or unintentional weight loss. FNA of 4 cm right thyroid nodule was performed on 3/12/24 demonstrating papillary thyroid cancer.   Recent Labs: 5/25/24 Thyroglobulin- 24.5 ng/ml    previously 192 ng/ml  TSH: 13  uIU/mL     previously 1.31 uIU/mL Free T4  0.9 ng/dL  previously  1.2 ng/dL	 Antithyroid Ab  were all in normal ranges   Denies dysphagia, odynophagia, aspirations, dyspnea, dysphonia  CT NECK IV_3/21/24 FINDINGS: BRAIN: No focal enhancing mass lesions in the partially imaged brain parenchyma SKULL BASE: Unremarkable AERODIGESTIVE TRACT: No masslike lesions or abnormal enhancement the base of the tongue, oropharynx and nasopharynx. Lymphoid hyperplasia of the lingual tonsils. Left-sided tonsilloliths. No lesions or abnormal collection in retropharyngeal spaces. Paraglottic fat is intact. Vocal cords are symmetric. Supraglottic, glottic and infraglottic airways are patent. LYMPH NODES: No cervical adenopathy by CT size criteria. Subcentimeter prominent left station 2A lymph node, nonspecific. (Series 5, image 38 SALIVARY GLANDS: Salivary glands are normal in size and symmetric. No abnormal enhancement or calcifications are identified. THYROID: Asymmetrically enlargement of the right thyroid gland with large about 2.6 cm x 2.0 cm x 4.3 cm (AP X TR X CC) heterogeneous nodule. No exophytic component or extension beyond the thyroid gland. PARANASAL SINUSES: Scattered opacification of ethmoid air cell complex. Polypoid mucosal thickening versus retention cyst and aerosolized secretion in the left sphenoid chamber. Mild mucosal thickening in the maxillary sinus and opacification of maxillary infundibula. ORBITS: Intraorbital content is unremarkable. VESSELS: Patent extracranial carotid and vertebral arteries accounting for limits of given exam which is not tailored for angiographic assessment. LUNG APICES: No acute findings. No mass lesions. Please refer to concurrently obtained CT chest report. BONES: No bony destructive lesions.  IMPRESSION: No masslike lesions or abnormal enhancement in aerodigestive mucosa. No cervical adenopathy. Nonspecific prominent left station 2A lymph node. Right thyroid asymmetrical enlargement containing large heterogeneous nodule, biopsy-proven neoplastic lesion.   - CT CHEST IC  DATE:  03/21/2024. CT scan of the chest was obtained following administration of intravenous contrast. Approximately 90 cc of Omnipaque was administered and 10 cc was discarded. Right lobe of the thyroid gland is enlarged in size and heterogeneous in attenuation. No hilar or mediastinal adenopathy is noted. Heart is normal in size. No pericardial effusion is noted. No endobronchial lesions are noted. Lungs are clear. No pleural effusions are noted. Below the diaphragm, visualized portions of the abdomen are unremarkable. Visualized osseous structures are within normal limits. IMPRESSION: Clear lungs.

## 2024-07-29 NOTE — PHYSICAL EXAM
[Midline] : trachea located in midline position [Normal] : no rashes [de-identified] : Incision healed well

## 2024-07-29 NOTE — PHYSICAL EXAM
[Midline] : trachea located in midline position [Normal] : no rashes [de-identified] : Incision healed well

## 2024-10-10 ENCOUNTER — APPOINTMENT (OUTPATIENT)
Dept: ULTRASOUND IMAGING | Facility: CLINIC | Age: 34
End: 2024-10-10
Payer: MEDICAID

## 2024-10-10 ENCOUNTER — OUTPATIENT (OUTPATIENT)
Dept: OUTPATIENT SERVICES | Facility: HOSPITAL | Age: 34
LOS: 1 days | End: 2024-10-10

## 2024-10-10 DIAGNOSIS — Z98.890 OTHER SPECIFIED POSTPROCEDURAL STATES: Chronic | ICD-10-CM

## 2024-10-10 PROCEDURE — 76536 US EXAM OF HEAD AND NECK: CPT | Mod: 26

## 2024-10-28 ENCOUNTER — APPOINTMENT (OUTPATIENT)
Dept: OTOLARYNGOLOGY | Facility: CLINIC | Age: 34
End: 2024-10-28

## 2024-11-13 ENCOUNTER — APPOINTMENT (OUTPATIENT)
Dept: ENDOCRINOLOGY | Facility: CLINIC | Age: 34
End: 2024-11-13

## 2024-11-14 DIAGNOSIS — C73 MALIGNANT NEOPLASM OF THYROID GLAND: ICD-10-CM

## 2024-11-14 RX ORDER — LEVOTHYROXINE SODIUM 0.07 MG/1
75 TABLET ORAL DAILY
Qty: 1 | Refills: 1 | Status: ACTIVE | COMMUNITY
Start: 2024-11-14 | End: 1900-01-01

## 2025-02-21 ENCOUNTER — ASOB RESULT (OUTPATIENT)
Age: 35
End: 2025-02-21

## 2025-02-21 ENCOUNTER — APPOINTMENT (OUTPATIENT)
Dept: ANTEPARTUM | Facility: CLINIC | Age: 35
End: 2025-02-21
Payer: MEDICAID

## 2025-02-21 PROCEDURE — 76811 OB US DETAILED SNGL FETUS: CPT

## 2025-03-11 ENCOUNTER — APPOINTMENT (OUTPATIENT)
Dept: ENDOCRINOLOGY | Facility: CLINIC | Age: 35
End: 2025-03-11
Payer: MEDICAID

## 2025-03-11 VITALS
SYSTOLIC BLOOD PRESSURE: 109 MMHG | DIASTOLIC BLOOD PRESSURE: 71 MMHG | WEIGHT: 166 LBS | BODY MASS INDEX: 26.06 KG/M2 | OXYGEN SATURATION: 96 % | HEIGHT: 67 IN | TEMPERATURE: 98.1 F | HEART RATE: 86 BPM

## 2025-03-11 DIAGNOSIS — E03.9 HYPOTHYROIDISM, UNSPECIFIED: ICD-10-CM

## 2025-03-11 DIAGNOSIS — C73 MALIGNANT NEOPLASM OF THYROID GLAND: ICD-10-CM

## 2025-03-11 PROCEDURE — 99215 OFFICE O/P EST HI 40 MIN: CPT

## 2025-03-13 LAB
T4 FREE SERPL-MCNC: 1 NG/DL
T4 SERPL-MCNC: 11 UG/DL
THYROGLOB AB SERPL-ACNC: <15 IU/ML
THYROGLOB SERPL-MCNC: 3.48 NG/ML
TSH SERPL-ACNC: 1.73 UIU/ML
